# Patient Record
Sex: FEMALE | Race: OTHER | HISPANIC OR LATINO
[De-identification: names, ages, dates, MRNs, and addresses within clinical notes are randomized per-mention and may not be internally consistent; named-entity substitution may affect disease eponyms.]

---

## 2017-12-12 ENCOUNTER — RESULT REVIEW (OUTPATIENT)
Age: 22
End: 2017-12-12

## 2017-12-12 ENCOUNTER — INPATIENT (INPATIENT)
Facility: HOSPITAL | Age: 22
LOS: 1 days | Discharge: HOME | End: 2017-12-14
Attending: OBSTETRICS & GYNECOLOGY | Admitting: OBSTETRICS & GYNECOLOGY
Payer: MEDICAID

## 2017-12-12 VITALS — HEIGHT: 63 IN | WEIGHT: 149.91 LBS

## 2017-12-12 DIAGNOSIS — Z34.80 ENCOUNTER FOR SUPERVISION OF OTHER NORMAL PREGNANCY, UNSPECIFIED TRIMESTER: ICD-10-CM

## 2017-12-12 DIAGNOSIS — O26.899 OTHER SPECIFIED PREGNANCY RELATED CONDITIONS, UNSPECIFIED TRIMESTER: ICD-10-CM

## 2017-12-12 DIAGNOSIS — Z3A.00 WEEKS OF GESTATION OF PREGNANCY NOT SPECIFIED: ICD-10-CM

## 2017-12-12 LAB
APTT BLD: 30.3 SEC — SIGNIFICANT CHANGE UP (ref 27.5–37.4)
BASOPHILS # BLD AUTO: 0.1 K/UL — SIGNIFICANT CHANGE UP (ref 0–0.2)
BASOPHILS NFR BLD AUTO: 0.8 % — SIGNIFICANT CHANGE UP (ref 0–2)
EOSINOPHIL # BLD AUTO: 0.1 K/UL — SIGNIFICANT CHANGE UP (ref 0–0.5)
EOSINOPHIL NFR BLD AUTO: 0.5 % — SIGNIFICANT CHANGE UP (ref 0–6)
FIBRINOGEN PPP-MCNC: 659 MG/DL — HIGH (ref 310–510)
HBV SURFACE AG SERPL QL IA: SIGNIFICANT CHANGE UP
HCT VFR BLD CALC: 41.6 % — SIGNIFICANT CHANGE UP (ref 34.5–45)
HGB BLD-MCNC: 13.6 G/DL — SIGNIFICANT CHANGE UP (ref 11.5–15.5)
INR BLD: 0.96 RATIO — SIGNIFICANT CHANGE UP (ref 0.88–1.16)
LYMPHOCYTES # BLD AUTO: 1.9 K/UL — SIGNIFICANT CHANGE UP (ref 1–3.3)
LYMPHOCYTES # BLD AUTO: 17.6 % — SIGNIFICANT CHANGE UP (ref 13–44)
MCHC RBC-ENTMCNC: 32.3 PG — SIGNIFICANT CHANGE UP (ref 27–34)
MCHC RBC-ENTMCNC: 32.6 GM/DL — SIGNIFICANT CHANGE UP (ref 32–36)
MCV RBC AUTO: 99.2 FL — SIGNIFICANT CHANGE UP (ref 80–100)
MONOCYTES # BLD AUTO: 0.7 K/UL — SIGNIFICANT CHANGE UP (ref 0–0.9)
MONOCYTES NFR BLD AUTO: 6.3 % — SIGNIFICANT CHANGE UP (ref 2–14)
NEUTROPHILS # BLD AUTO: 8.3 K/UL — HIGH (ref 1.8–7.4)
NEUTROPHILS NFR BLD AUTO: 74.9 % — SIGNIFICANT CHANGE UP (ref 43–77)
PCP SPEC-MCNC: SIGNIFICANT CHANGE UP
PLATELET # BLD AUTO: 191 K/UL — SIGNIFICANT CHANGE UP (ref 150–400)
PROTHROM AB SERPL-ACNC: 10.5 SEC — SIGNIFICANT CHANGE UP (ref 9.8–12.7)
RBC # BLD: 4.2 M/UL — SIGNIFICANT CHANGE UP (ref 3.8–5.2)
RBC # FLD: 13 % — SIGNIFICANT CHANGE UP (ref 10.3–14.5)
WBC # BLD: 11 K/UL — HIGH (ref 3.8–10.5)
WBC # FLD AUTO: 11 K/UL — HIGH (ref 3.8–10.5)

## 2017-12-12 PROCEDURE — 76819 FETAL BIOPHYS PROFIL W/O NST: CPT | Mod: 26

## 2017-12-12 PROCEDURE — 88307 TISSUE EXAM BY PATHOLOGIST: CPT | Mod: 26

## 2017-12-12 RX ORDER — ACETAMINOPHEN 500 MG
650 TABLET ORAL EVERY 6 HOURS
Qty: 0 | Refills: 0 | Status: DISCONTINUED | OUTPATIENT
Start: 2017-12-12 | End: 2017-12-14

## 2017-12-12 RX ORDER — OXYTOCIN 10 UNIT/ML
333.33 VIAL (ML) INJECTION
Qty: 20 | Refills: 0 | Status: DISCONTINUED | OUTPATIENT
Start: 2017-12-12 | End: 2017-12-13

## 2017-12-12 RX ORDER — SODIUM CHLORIDE 9 MG/ML
1000 INJECTION, SOLUTION INTRAVENOUS
Qty: 0 | Refills: 0 | Status: DISCONTINUED | OUTPATIENT
Start: 2017-12-12 | End: 2017-12-12

## 2017-12-12 RX ORDER — SIMETHICONE 80 MG/1
80 TABLET, CHEWABLE ORAL EVERY 6 HOURS
Qty: 0 | Refills: 0 | Status: DISCONTINUED | OUTPATIENT
Start: 2017-12-12 | End: 2017-12-14

## 2017-12-12 RX ORDER — TETANUS TOXOID, REDUCED DIPHTHERIA TOXOID AND ACELLULAR PERTUSSIS VACCINE, ADSORBED 5; 2.5; 8; 8; 2.5 [IU]/.5ML; [IU]/.5ML; UG/.5ML; UG/.5ML; UG/.5ML
0.5 SUSPENSION INTRAMUSCULAR ONCE
Qty: 0 | Refills: 0 | Status: DISCONTINUED | OUTPATIENT
Start: 2017-12-12 | End: 2017-12-14

## 2017-12-12 RX ORDER — PRAMOXINE HYDROCHLORIDE 150 MG/15G
1 AEROSOL, FOAM RECTAL EVERY 4 HOURS
Qty: 0 | Refills: 0 | Status: DISCONTINUED | OUTPATIENT
Start: 2017-12-12 | End: 2017-12-14

## 2017-12-12 RX ORDER — AER TRAVELER 0.5 G/1
1 SOLUTION RECTAL; TOPICAL EVERY 4 HOURS
Qty: 0 | Refills: 0 | Status: DISCONTINUED | OUTPATIENT
Start: 2017-12-12 | End: 2017-12-14

## 2017-12-12 RX ORDER — DIPHENHYDRAMINE HCL 50 MG
25 CAPSULE ORAL EVERY 6 HOURS
Qty: 0 | Refills: 0 | Status: DISCONTINUED | OUTPATIENT
Start: 2017-12-12 | End: 2017-12-14

## 2017-12-12 RX ORDER — CITRIC ACID/SODIUM CITRATE 300-500 MG
15 SOLUTION, ORAL ORAL EVERY 4 HOURS
Qty: 0 | Refills: 0 | Status: DISCONTINUED | OUTPATIENT
Start: 2017-12-12 | End: 2017-12-12

## 2017-12-12 RX ORDER — OXYTOCIN 10 UNIT/ML
2 VIAL (ML) INJECTION
Qty: 30 | Refills: 0 | Status: DISCONTINUED | OUTPATIENT
Start: 2017-12-12 | End: 2017-12-12

## 2017-12-12 RX ORDER — SODIUM CHLORIDE 9 MG/ML
3 INJECTION INTRAMUSCULAR; INTRAVENOUS; SUBCUTANEOUS EVERY 8 HOURS
Qty: 0 | Refills: 0 | Status: DISCONTINUED | OUTPATIENT
Start: 2017-12-12 | End: 2017-12-14

## 2017-12-12 RX ORDER — DIBUCAINE 1 %
1 OINTMENT (GRAM) RECTAL EVERY 4 HOURS
Qty: 0 | Refills: 0 | Status: DISCONTINUED | OUTPATIENT
Start: 2017-12-12 | End: 2017-12-14

## 2017-12-12 RX ORDER — DOCUSATE SODIUM 100 MG
100 CAPSULE ORAL
Qty: 0 | Refills: 0 | Status: DISCONTINUED | OUTPATIENT
Start: 2017-12-12 | End: 2017-12-14

## 2017-12-12 RX ORDER — SODIUM CHLORIDE 9 MG/ML
1000 INJECTION, SOLUTION INTRAVENOUS ONCE
Qty: 0 | Refills: 0 | Status: DISCONTINUED | OUTPATIENT
Start: 2017-12-12 | End: 2017-12-12

## 2017-12-12 RX ORDER — LANOLIN
1 OINTMENT (GRAM) TOPICAL EVERY 6 HOURS
Qty: 0 | Refills: 0 | Status: DISCONTINUED | OUTPATIENT
Start: 2017-12-12 | End: 2017-12-14

## 2017-12-12 RX ORDER — HYDROCORTISONE 1 %
1 OINTMENT (GRAM) TOPICAL EVERY 4 HOURS
Qty: 0 | Refills: 0 | Status: DISCONTINUED | OUTPATIENT
Start: 2017-12-12 | End: 2017-12-14

## 2017-12-12 RX ORDER — OXYCODONE AND ACETAMINOPHEN 5; 325 MG/1; MG/1
2 TABLET ORAL EVERY 6 HOURS
Qty: 0 | Refills: 0 | Status: DISCONTINUED | OUTPATIENT
Start: 2017-12-12 | End: 2017-12-14

## 2017-12-12 RX ORDER — OXYTOCIN 10 UNIT/ML
41.67 VIAL (ML) INJECTION
Qty: 20 | Refills: 0 | Status: DISCONTINUED | OUTPATIENT
Start: 2017-12-12 | End: 2017-12-14

## 2017-12-12 RX ORDER — IBUPROFEN 200 MG
600 TABLET ORAL EVERY 6 HOURS
Qty: 0 | Refills: 0 | Status: DISCONTINUED | OUTPATIENT
Start: 2017-12-12 | End: 2017-12-14

## 2017-12-12 RX ADMIN — SODIUM CHLORIDE 125 MILLILITER(S): 9 INJECTION, SOLUTION INTRAVENOUS at 18:35

## 2017-12-12 RX ADMIN — Medication 2 MILLIUNIT(S)/MIN: at 20:45

## 2017-12-12 RX ADMIN — Medication 650 MILLIGRAM(S): at 23:09

## 2017-12-13 DIAGNOSIS — F41.9 ANXIETY DISORDER, UNSPECIFIED: ICD-10-CM

## 2017-12-13 LAB
BASOPHILS # BLD AUTO: 0.1 K/UL — SIGNIFICANT CHANGE UP (ref 0–0.2)
BASOPHILS NFR BLD AUTO: 0.6 % — SIGNIFICANT CHANGE UP (ref 0–2)
EOSINOPHIL # BLD AUTO: 0 K/UL — SIGNIFICANT CHANGE UP (ref 0–0.5)
EOSINOPHIL NFR BLD AUTO: 0.1 % — SIGNIFICANT CHANGE UP (ref 0–6)
HCT VFR BLD CALC: 34.4 % — LOW (ref 34.5–45)
HGB BLD-MCNC: 11.7 G/DL — SIGNIFICANT CHANGE UP (ref 11.5–15.5)
LYMPHOCYTES # BLD AUTO: 17.2 % — SIGNIFICANT CHANGE UP (ref 13–44)
LYMPHOCYTES # BLD AUTO: 2.3 K/UL — SIGNIFICANT CHANGE UP (ref 1–3.3)
MCHC RBC-ENTMCNC: 33.4 PG — SIGNIFICANT CHANGE UP (ref 27–34)
MCHC RBC-ENTMCNC: 33.9 GM/DL — SIGNIFICANT CHANGE UP (ref 32–36)
MCV RBC AUTO: 98.6 FL — SIGNIFICANT CHANGE UP (ref 80–100)
MONOCYTES # BLD AUTO: 1 K/UL — HIGH (ref 0–0.9)
MONOCYTES NFR BLD AUTO: 7.4 % — SIGNIFICANT CHANGE UP (ref 2–14)
NEUTROPHILS # BLD AUTO: 9.9 K/UL — HIGH (ref 1.8–7.4)
NEUTROPHILS NFR BLD AUTO: 74.7 % — SIGNIFICANT CHANGE UP (ref 43–77)
PLATELET # BLD AUTO: 190 K/UL — SIGNIFICANT CHANGE UP (ref 150–400)
RBC # BLD: 3.49 M/UL — LOW (ref 3.8–5.2)
RBC # FLD: 13.1 % — SIGNIFICANT CHANGE UP (ref 10.3–14.5)
T PALLIDUM AB TITR SER: NEGATIVE — SIGNIFICANT CHANGE UP
WBC # BLD: 13.3 K/UL — HIGH (ref 3.8–10.5)
WBC # FLD AUTO: 13.3 K/UL — HIGH (ref 3.8–10.5)

## 2017-12-13 RX ADMIN — SODIUM CHLORIDE 3 MILLILITER(S): 9 INJECTION INTRAMUSCULAR; INTRAVENOUS; SUBCUTANEOUS at 08:00

## 2017-12-13 RX ADMIN — Medication 1 TABLET(S): at 13:24

## 2017-12-13 RX ADMIN — Medication 125 MILLIUNIT(S)/MIN: at 00:01

## 2017-12-13 RX ADMIN — SODIUM CHLORIDE 3 MILLILITER(S): 9 INJECTION INTRAMUSCULAR; INTRAVENOUS; SUBCUTANEOUS at 13:24

## 2017-12-13 RX ADMIN — Medication 650 MILLIGRAM(S): at 00:31

## 2017-12-14 ENCOUNTER — TRANSCRIPTION ENCOUNTER (OUTPATIENT)
Age: 22
End: 2017-12-14

## 2017-12-14 VITALS
DIASTOLIC BLOOD PRESSURE: 67 MMHG | HEART RATE: 65 BPM | SYSTOLIC BLOOD PRESSURE: 114 MMHG | TEMPERATURE: 98 F | OXYGEN SATURATION: 98 % | RESPIRATION RATE: 15 BRPM

## 2017-12-14 PROCEDURE — 86900 BLOOD TYPING SEROLOGIC ABO: CPT

## 2017-12-14 PROCEDURE — 86780 TREPONEMA PALLIDUM: CPT

## 2017-12-14 PROCEDURE — 85610 PROTHROMBIN TIME: CPT

## 2017-12-14 PROCEDURE — 76819 FETAL BIOPHYS PROFIL W/O NST: CPT

## 2017-12-14 PROCEDURE — 36415 COLL VENOUS BLD VENIPUNCTURE: CPT

## 2017-12-14 PROCEDURE — 80307 DRUG TEST PRSMV CHEM ANLYZR: CPT

## 2017-12-14 PROCEDURE — 87340 HEPATITIS B SURFACE AG IA: CPT

## 2017-12-14 PROCEDURE — 86850 RBC ANTIBODY SCREEN: CPT

## 2017-12-14 PROCEDURE — 59025 FETAL NON-STRESS TEST: CPT

## 2017-12-14 PROCEDURE — 86901 BLOOD TYPING SEROLOGIC RH(D): CPT

## 2017-12-14 PROCEDURE — G0463: CPT

## 2017-12-14 PROCEDURE — 85730 THROMBOPLASTIN TIME PARTIAL: CPT

## 2017-12-14 PROCEDURE — 85384 FIBRINOGEN ACTIVITY: CPT

## 2017-12-14 PROCEDURE — 85027 COMPLETE CBC AUTOMATED: CPT

## 2017-12-14 PROCEDURE — 59050 FETAL MONITOR W/REPORT: CPT

## 2017-12-14 RX ORDER — SENNA PLUS 8.6 MG/1
2 TABLET ORAL
Qty: 20 | Refills: 0
Start: 2017-12-14 | End: 2017-12-23

## 2017-12-14 RX ORDER — IBUPROFEN 200 MG
1 TABLET ORAL
Qty: 40 | Refills: 0
Start: 2017-12-14 | End: 2017-12-23

## 2017-12-14 RX ORDER — DOCUSATE SODIUM 100 MG
1 CAPSULE ORAL
Qty: 40 | Refills: 0
Start: 2017-12-14 | End: 2018-01-02

## 2017-12-14 NOTE — DISCHARGE NOTE OB - CARE PLAN
Principal Discharge DX:	 (normal spontaneous vaginal delivery)  Goal:	pain management and breastfeeding  Instructions for follow-up, activity and diet:	Continue breastfeeding.  Motrin as needed for pain. Avoid strenuous activity, no heavy lifting. Nothing per vagina x 6 weeks - no sex, tampons, douching, tub baths, etc.  Follow up in office in 5-6 weeks for check up  Secondary Diagnosis:	Anxiety  Goal:	cleared by social work

## 2017-12-14 NOTE — PROGRESS NOTE ADULT - PROBLEM SELECTOR PLAN 1
A/P:  Postpartum day two in stable condition   - Discharge home with instructions  -Follow up in office in 5-6 weeks for postpartum visit  -Breastfeeding encouraged   -Continue pain management  -Encourage OOB and ambulation  -Continue current care
Pain management  continue breastfeeding  routine postpartum care

## 2017-12-14 NOTE — PROGRESS NOTE ADULT - SUBJECTIVE AND OBJECTIVE BOX
Patient seen resting comfortably.  No new complaints. Reports incisional pain, controlled on pain medications.  Denies CP, SOB, N/V/D, dizziness, palpitations. Voiding spontaneously, denies dysuria, urgency or frequency. +Flatus, - BM. Tolerating regular diet.     Vital Signs Last 24 Hrs  T(C): 36.9 (14 Dec 2017 05:28), Max: 37.2 (13 Dec 2017 19:21)  T(F): 98.4 (14 Dec 2017 05:28), Max: 98.9 (13 Dec 2017 19:21)  HR: 65 (14 Dec 2017 05:28) (65 - 90)  BP: 114/67 (14 Dec 2017 05:28) (103/63 - 114/67)  RR: 15 (14 Dec 2017 05:28) (15 - 18)  SpO2: 98% (14 Dec 2017 05:28) (97% - 100%)    Gen: A&O x 3, NAD  Chest: CTABL  Cardiac: S1+S2+ RRR  Breast: Soft, nontender, nonengorged  Abdomen: Nontender, nondistended, +BS, Incision c/d/i, no erythema  Gyn: lochia wnl  Extremities: Nontender, no worsening edema                          11.7   13.3  )-----------( 190      ( 13 Dec 2017 18:09 )             34.4
Patient evaluated at bedside, offers no acute complaints.  Resting comfortably with baby at bedside.  Tolerating regular diet, Voiding without difficulty; +flatus, -BM  Currently breast and bottlefeeding.  Denies fever/chills, nausea/vomiting, headache, dizziness, chest pains, shortness of breath, palpitations    Vital Signs Last 24 Hrs  T(C): 36.7 (13 Dec 2017 08:16), Max: 37 (13 Dec 2017 06:41)  T(F): 98.1 (13 Dec 2017 08:16), Max: 98.6 (13 Dec 2017 06:41)  HR: 90 (13 Dec 2017 08:16) (80 - 100)  BP: 108/71 (13 Dec 2017 08:16) (92/53 - 126/73)  BP(mean): --  RR: 18 (13 Dec 2017 08:16) (16 - 18)  SpO2: 100% (13 Dec 2017 08:16) (97% - 100%)    PE: Pt appears comfortable, NAD, AAOx3  Chest: CTA bilaterally, no W/R/R  Cardiac: RRR  Breasts: soft, NT/nonengorged bilaterally; no masses, no erythema  Abd: soft; NT; no guarding or rebound; +BS x4 quad; Fundus firm NT below umbilicus  Gyn: moderate lochia, intact/repaired  Ext: soft, NT; DTRs 2+ bilaterally; no worsening edema                          13.6   11.0  )-----------( 191      ( 12 Dec 2017 17:39 )             41.6

## 2017-12-14 NOTE — DISCHARGE NOTE OB - HOSPITAL COURSE
Patient came in early labor, s/p   routine post partum care, clearance by social work  discharged home clinically stable

## 2017-12-14 NOTE — DISCHARGE NOTE OB - MEDICATION SUMMARY - MEDICATIONS TO TAKE
I will START or STAY ON the medications listed below when I get home from the hospital:    ibuprofen 600 mg oral tablet  -- 1 tab(s) by mouth every 6 hours, As needed, Moderate Pain  -- Indication: For pain, as needed    docusate sodium 100 mg oral capsule  -- 1 cap(s) by mouth 2 times a day, As needed, Stool Softening  -- Indication: For Stool softener    senna 15 mg oral tablet  -- 2 tab(s) by mouth once a day (at bedtime)   -- Indication: For constipation

## 2017-12-14 NOTE — DISCHARGE NOTE OB - PATIENT PORTAL LINK FT
“You can access the FollowHealth Patient Portal, offered by [facility], by registering with the following website: [email or web address]”

## 2017-12-14 NOTE — DISCHARGE NOTE OB - PLAN OF CARE
pain management and breastfeeding Continue breastfeeding.  Motrin as needed for pain. Avoid strenuous activity, no heavy lifting. Nothing per vagina x 6 weeks - no sex, tampons, douching, tub baths, etc.  Follow up in office in 5-6 weeks for check up cleared by social work

## 2017-12-14 NOTE — PROGRESS NOTE ADULT - PROBLEM SELECTOR PLAN 2
discharge pending social work clearance, patient is otherwise medically cleared
social work ordered for evaluation

## 2017-12-14 NOTE — PROGRESS NOTE ADULT - ASSESSMENT
PPD 2 s/p  at 39 weeks; clinically stable, discharge pending social work clearance
22y s/p  PPD#1, stable

## 2018-01-12 PROBLEM — Z00.00 ENCOUNTER FOR PREVENTIVE HEALTH EXAMINATION: Status: ACTIVE | Noted: 2018-01-12

## 2018-01-22 ENCOUNTER — APPOINTMENT (OUTPATIENT)
Dept: OBGYN | Facility: CLINIC | Age: 23
End: 2018-01-22

## 2019-04-15 NOTE — PATIENT PROFILE OB - WEIGHT PRESENT IN KG
[email or web address] ,[email or web address]

## 2021-03-04 NOTE — DISCHARGE NOTE OB - AVOID SITTING IN ONE POSITION FOR MORE THAN ONE HOUR
-- DO NOT REPLY / DO NOT REPLY ALL --  -- Message is from the [facility]--    COVID-19 Universal Screening: N/A - Not about scheduling    General Patient Message      Reason for Call: Patient called in because she missed a call from Dr. Frankel office because they want to know her reason for visit . Please call patient     Caller Information       Type Contact Phone    03/04/2021 04:02 PM CST Phone (Incoming) MaddenElla wood (Self) [phone] (M)          Alternative phone number: none    Turnaround time given to caller:   \"This message will be sent to [state Provider's name]. The clinical team will fulfill your request as soon as they review your message.\"     Statement Selected

## 2022-08-07 ENCOUNTER — OUTPATIENT (OUTPATIENT)
Dept: INPATIENT UNIT | Facility: HOSPITAL | Age: 27
LOS: 1 days | Discharge: HOME | End: 2022-08-07

## 2022-08-07 VITALS
TEMPERATURE: 99 F | RESPIRATION RATE: 14 BRPM | HEART RATE: 96 BPM | SYSTOLIC BLOOD PRESSURE: 122 MMHG | DIASTOLIC BLOOD PRESSURE: 82 MMHG

## 2022-08-07 VITALS — HEART RATE: 67 BPM | DIASTOLIC BLOOD PRESSURE: 59 MMHG | SYSTOLIC BLOOD PRESSURE: 107 MMHG

## 2022-08-07 DIAGNOSIS — Z98.890 OTHER SPECIFIED POSTPROCEDURAL STATES: Chronic | ICD-10-CM

## 2022-08-07 DIAGNOSIS — O26.899 OTHER SPECIFIED PREGNANCY RELATED CONDITIONS, UNSPECIFIED TRIMESTER: ICD-10-CM

## 2022-08-07 DIAGNOSIS — Z3A.00 WEEKS OF GESTATION OF PREGNANCY NOT SPECIFIED: ICD-10-CM

## 2022-08-07 LAB
APPEARANCE UR: ABNORMAL
BACTERIA # UR AUTO: ABNORMAL
BILIRUB UR-MCNC: NEGATIVE — SIGNIFICANT CHANGE UP
COLOR SPEC: YELLOW — SIGNIFICANT CHANGE UP
DIFF PNL FLD: NEGATIVE — SIGNIFICANT CHANGE UP
EPI CELLS # UR: 2 /HPF — SIGNIFICANT CHANGE UP (ref 0–5)
GLUCOSE UR QL: NEGATIVE — SIGNIFICANT CHANGE UP
HYALINE CASTS # UR AUTO: 0 /LPF — SIGNIFICANT CHANGE UP (ref 0–7)
KETONES UR-MCNC: ABNORMAL
LEUKOCYTE ESTERASE UR-ACNC: ABNORMAL
NITRITE UR-MCNC: NEGATIVE — SIGNIFICANT CHANGE UP
PH UR: 7.5 — SIGNIFICANT CHANGE UP (ref 5–8)
PROT UR-MCNC: ABNORMAL
RBC CASTS # UR COMP ASSIST: 1 /HPF — SIGNIFICANT CHANGE UP (ref 0–4)
SP GR SPEC: 1.01 — SIGNIFICANT CHANGE UP (ref 1–1.05)
UROBILINOGEN FLD QL: SIGNIFICANT CHANGE UP
WBC UR QL: 30 /HPF — HIGH (ref 0–5)

## 2022-08-07 PROCEDURE — 99213 OFFICE O/P EST LOW 20 MIN: CPT | Mod: 25

## 2022-08-07 PROCEDURE — 76818 FETAL BIOPHYS PROFILE W/NST: CPT | Mod: 26

## 2022-08-07 RX ORDER — PROGESTERONE 200 MG/1
1 CAPSULE, LIQUID FILLED ORAL
Qty: 0 | Refills: 0 | DISCHARGE

## 2022-08-07 NOTE — OB PROVIDER TRIAGE NOTE - NSHPLABSRESULTS_GEN_ALL_CORE
Urinalysis Basic - ( 07 Aug 2022 20:56 )    Color: Yellow / Appearance: Slightly Turbid / S.011 / pH: x  Gluc: x / Ketone: Trace  / Bili: Negative / Urobili: <2 mg/dL   Blood: x / Protein: Trace / Nitrite: Negative   Leuk Esterase: Large / RBC: 1 /HPF / WBC 30 /HPF   Sq Epi: x / Non Sq Epi: 2 /HPF / Bacteria: Few

## 2022-08-07 NOTE — OB RN TRIAGE NOTE - LIVING CHILDREN, OB PROFILE
pt states she had boil lanced on her right breast today as well as a cortisone shot her other breast. pt states she had fever 103 at home took 2 extra strength Tylenol around 6pm. pt c/o left breast pain and feels like her heart is racing. 1

## 2022-08-07 NOTE — OB RN TRIAGE NOTE - ABORTIONS, OB PROFILE
3 Rhofade Pregnancy And Lactation Text: This medication has not been assigned a Pregnancy Risk Category. It is unknown if the medication is excreted in breast milk.

## 2022-08-07 NOTE — OB PROVIDER TRIAGE NOTE - CARE PLAN
1 Otezla Counseling: The side effects of Otezla were discussed with the patient, including but not limited to worsening or new depression, weight loss, diarrhea, nausea, upper respiratory tract infection, and headache. Patient instructed to call the office should any adverse effect occur.  The patient verbalized understanding of the proper use and possible adverse effects of Otezla.  All the patient's questions and concerns were addressed.

## 2022-08-07 NOTE — OB PROVIDER TRIAGE NOTE - NSHPPHYSICALEXAM_GEN_ALL_CORE
NST--FHR: 140 HR baseline, moderate variability, accelerations present, no decelerations, Category 1.  [place]: Contractions present, irregular  TAUS: cephalic presentation, posterior placenta, RONY 10.89, BPP 8/8  SSE: moderate amount of yellowish discharge, cervix appear dilated. no bleeding noted, negative Nitrazine, negative ferning.   SVE; 2cm/50/-3 Vital Signs Last 24 Hrs  T(C): 37.1 (07 Aug 2022 18:48), Max: 37.1 (07 Aug 2022 18:48)  T(F): 98.8 (07 Aug 2022 18:48), Max: 98.8 (07 Aug 2022 18:48)  HR: 67 (07 Aug 2022 21:26) (67 - 96)  BP: 107/59 (07 Aug 2022 21:26) (97/56 - 131/79)  RR: 14 (07 Aug 2022 18:48) (14 - 14)        Gen: NAD  Head: NC/AT  Cardio: RRR  Resp: Clear lung sound bilaterally  Abdomen: Soft, Nontender  Extremities: No LE edema bilaterally    NST--FHR: 140 HR baseline, moderate variability, accelerations present, no decelerations, Category 1.  [place]: Contractions present, irregular  TAUS: cephalic presentation, posterior placenta, RONY 10.89, BPP 8/8  SSE: moderate amount of yellowish discharge, cervix appear dilated. no bleeding noted, negative Nitrazine, negative ferning.   SVE; 2cm/50/-3

## 2022-08-07 NOTE — OB PROVIDER TRIAGE NOTE - NSOBPROVIDERNOTE_OBGYN_ALL_OB_FT
27 yo , EGA@38 weeks R/O Rupture of membrane 27 yo , EGA@38 weeks R/O Rupture of membrane   discuss with Dr. Davis PGY-3   Management of further care was discussed with Dr. Vivienne burgos S/S of Rupture of membrane at this time  pt to keep self very well hydrated.   Discharge patient home.  Labor precautions and fetal movements count were reviewed; if not in labor, will follow up with your next schedule OB appointment.  Prior notes, lab results (prenatal chart review, prenatal labs) and recommendations were reviewed;  All ordered tests results reviewed and interpreted.  Plan of care was reviewed with patient and family; patient states understanding of the above plan.  In total 35 minutes spent with established/new patient.

## 2022-08-07 NOTE — OB RN TRIAGE NOTE - CURRENT PREGNANCY COMPLICATIONS, OB PROFILE
dx short cvx since 2nd trimester, progesterone until 36wks/Incompetent Cervix/Cervical Insufficiency

## 2022-08-07 NOTE — OB PROVIDER TRIAGE NOTE - NS_OBGYNHISTORY_OBGYN_ALL_OB_FT
2017  @ 38 weeks F 6-12 complicated by NRFHR. at [facility]  TOP x 1 with meds  and SAB x 2 1 with D&C

## 2022-08-07 NOTE — OB PROVIDER TRIAGE NOTE - HISTORY OF PRESENT ILLNESS
27 yo , EGA@38 weeks, presented to D&T with c/o yellowish to greenish discharged starting from 3 PM 22, Pt also C/O of contractions irregular, every 2-3hrs pain 2/10, denies vaginal bleeding, and reports fetal movement.  Pt stated that she was checked yesterday by her provider and she was 2Cm dilated. Denies fever, chills, headaches, changes in vision, chest pain, palpitations, shortness of breath, cough, nausea, vomiting, diarrhea, constipation, urinary symptoms, edema.    Prenatal care with Dr. stark at [facility]  Prenatal course is complicated by shorten cervix diagnose since 2nd trimester, Pt was on vaginal progesterone till 36 weeks.    GBS status is unknown  Patient denies signs and symptoms of COVID 19; denies symptomatic illness; fully vaccinated.    No adverse reactions to anesthesia, no objections to blood transfusions if clinically indicated.  OB hx:   2017  @ 38 weeks F 6-12 complicated by NRFHR. at [facility]  TOP x 1 with meds  and SAB x 2 1 with D&C     Med hx: Carrier Polycystic Kidney.  Surg hx: D&C x 1  GYN hx: denies hx of abnormal papsmear/cysts/fibroids/STDs  Meds: ION, PNV  Allergies: NKDA    Social hx: Denies alcohol, tobacco, drug use  Psych hx: denies hx of anxiety/depression     27 yo , EGA@38 weeks, presented to D&T with c/o yellowish to greenish discharged starting from 3 PM 22, Pt also C/O of contractions irregular, every 2-3hrs pain /10, Pt report decrease fetal movement today. Pt. denies vaginal bleeding.  Pt stated that she was checked yesterday by her provider and she was 2Cm dilated. Denies fever, chills, headaches, changes in vision, chest pain, palpitations, shortness of breath, cough, nausea, vomiting, diarrhea, constipation, urinary symptoms, edema.    Prenatal care with Dr. stark at [facility]  Prenatal course is complicated by shorten cervix diagnose since 2nd trimester, Pt was on vaginal progesterone till 36 weeks.    GBS status is unknown  Patient denies signs and symptoms of COVID 19; denies symptomatic illness; fully vaccinated.    No adverse reactions to anesthesia, no objections to blood transfusions if clinically indicated.  OB hx:   2017  @ 38 weeks F 6-12 complicated by NRFHR. at [facility]  TOP x 1 with meds  and SAB x 2 1 with D&C     Med hx: Carrier Polycystic Kidney.  Surg hx: D&C x 1  GYN hx: denies hx of abnormal papsmear/cysts/fibroids/STDs  Meds: ION, PNV  Allergies: NKDA    Social hx: Denies alcohol, tobacco, drug use  Psych hx: denies hx of anxiety/depression

## 2022-08-08 ENCOUNTER — TRANSCRIPTION ENCOUNTER (OUTPATIENT)
Age: 27
End: 2022-08-08

## 2022-08-11 ENCOUNTER — OUTPATIENT (OUTPATIENT)
Dept: OUTPATIENT SERVICES | Facility: HOSPITAL | Age: 27
LOS: 1 days | Discharge: HOME | End: 2022-08-11

## 2022-08-11 VITALS
SYSTOLIC BLOOD PRESSURE: 103 MMHG | RESPIRATION RATE: 16 BRPM | DIASTOLIC BLOOD PRESSURE: 57 MMHG | TEMPERATURE: 99 F | HEART RATE: 107 BPM

## 2022-08-11 VITALS — DIASTOLIC BLOOD PRESSURE: 59 MMHG | SYSTOLIC BLOOD PRESSURE: 114 MMHG | HEART RATE: 83 BPM

## 2022-08-11 DIAGNOSIS — Z98.890 OTHER SPECIFIED POSTPROCEDURAL STATES: Chronic | ICD-10-CM

## 2022-08-11 DIAGNOSIS — Z3A.00 WEEKS OF GESTATION OF PREGNANCY NOT SPECIFIED: ICD-10-CM

## 2022-08-11 DIAGNOSIS — O26.899 OTHER SPECIFIED PREGNANCY RELATED CONDITIONS, UNSPECIFIED TRIMESTER: ICD-10-CM

## 2022-08-11 LAB — AMNISURE ROM (RUPTURE OF MEMBRANES): NEGATIVE — SIGNIFICANT CHANGE UP

## 2022-08-11 PROCEDURE — 76818 FETAL BIOPHYS PROFILE W/NST: CPT | Mod: 26

## 2022-08-11 PROCEDURE — 99213 OFFICE O/P EST LOW 20 MIN: CPT

## 2022-08-11 RX ORDER — PROGESTERONE 200 MG/1
0 CAPSULE, LIQUID FILLED ORAL
Qty: 0 | Refills: 0 | DISCHARGE

## 2022-08-11 NOTE — OB PROVIDER TRIAGE NOTE - HISTORY OF PRESENT ILLNESS
25y/o  DEFAULT  @38.4wks presents with leaking of clear fluid since 1030pm on 8/10. States she was at her MD office today and was told to go to hospital for further evaluation for prom due to discharge and patient feeling wet since last night. Also states mild contractions felt 2x an hour   Patient MD delivers at [facility]  Reports good fetal movement  Denies VB    Allergies: Denies   Medications: PNV    Medical HX: Congenital Polycystic Kidney Disease   Surgical HX: Denies  Denies Etoh/Smoke/Drugs/Psy   27y/o DEFAULT  @38.4wks presents with leaking of clear fluid since 1030pm on 8/10. States she was at her MD office today and was told to go to hospital for further evaluation for prom due to discharge and patient feeling wet since last night. Also states mild contractions felt 2x an hour   Patient MD delivers at [facility]  Reports good fetal movement  Denies VB    Allergies: Denies   Medications: PNV    Medical HX: Congenital Polycystic Kidney Disease   Surgical HX: Denies  Denies Etoh/Smoke/Drugs/Psy

## 2022-08-11 NOTE — OB PROVIDER TRIAGE NOTE - NS_OBGYNHISTORY_OBGYN_ALL_OB_FT
GYN: Denies  OB:   18  female at 38wks  TOPx1  SABx2 with D&C    AP course complicated by  -Incompetent cervix   -Vaginal Progesterone, stopped at 36 weeks  -Patient receives are at [facility]

## 2022-08-11 NOTE — OB PROVIDER TRIAGE NOTE - PLAN OF CARE
D/C Home  D/W    No evidence of PROM at this time  Normal fetal testing  No evidence of acute process  Follow up at next scheduled prenatal appointment on 8/18  Return for decreased fetal movement, loss of fluid or vaginal bleeding  Increase oral hydration  Signs and Symptoms of Labor reviewed

## 2022-08-11 NOTE — OB PROVIDER TRIAGE NOTE - ADDITIONAL INSTRUCTIONS
recall entered, faxed to PCP. Follow up at next scheduled prenatal appointment on 8/18  Return for decreased fetal movement, loss of fluid or vaginal bleeding  Increase oral hydration  Signs and Symptoms of Labor reviewed

## 2022-08-11 NOTE — OB PROVIDER TRIAGE NOTE - NSHPPHYSICALEXAM_GEN_ALL_CORE
Vital Signs Last 24 Hrs  T(C): 37 (11 Aug 2022 17:45), Max: 37 (11 Aug 2022 17:45)  T(F): 98.6 (11 Aug 2022 17:45), Max: 98.6 (11 Aug 2022 17:45)  HR: 83 (11 Aug 2022 20:03) (81 - 107)  BP: 114/59 (11 Aug 2022 20:03) (94/56 - 114/59)  RR: 16 (11 Aug 2022 17:45) (16 - 16)    Assessment reveals VSS  Abdomen soft, NT, gravid  Cat 1 tracing, irregular ctx  Transabdominal Ultrasound-   Sterile Speculum- nitrazine equivocal, ferning, +leukorrhea noted   Vaginal Exam- 2/70/-3  A&Ox3  Lungs- clear bilateral  Heart- normal rate and rhythm      PLAN:  Amnisure Collected Vital Signs Last 24 Hrs  T(C): 37 (11 Aug 2022 17:45), Max: 37 (11 Aug 2022 17:45)  T(F): 98.6 (11 Aug 2022 17:45), Max: 98.6 (11 Aug 2022 17:45)  HR: 83 (11 Aug 2022 20:03) (81 - 107)  BP: 114/59 (11 Aug 2022 20:03) (94/56 - 114/59)  RR: 16 (11 Aug 2022 17:45) (16 - 16)    Assessment reveals VSS  Abdomen soft, NT, gravid  Cat 1 tracing, irregular ctx  Transabdominal Ultrasound- vtx, fundal placenta, bpp8/8, josep: 11.11  Sterile Speculum- nitrazine equivocal, ferning, +leukorrhea noted   Vaginal Exam- 2/70/-3  A&Ox3  Lungs- clear bilateral  Heart- normal rate and rhythm      PLAN:  Amnisure Collected

## 2022-08-12 PROBLEM — Q61.3 POLYCYSTIC KIDNEY, UNSPECIFIED: Chronic | Status: ACTIVE | Noted: 2022-08-07

## 2022-08-14 ENCOUNTER — TRANSCRIPTION ENCOUNTER (OUTPATIENT)
Age: 27
End: 2022-08-14

## 2022-08-14 ENCOUNTER — INPATIENT (INPATIENT)
Facility: HOSPITAL | Age: 27
LOS: 1 days | Discharge: HOME | End: 2022-08-16
Attending: OBSTETRICS & GYNECOLOGY | Admitting: OBSTETRICS & GYNECOLOGY
Payer: MEDICAID

## 2022-08-14 VITALS
HEIGHT: 60 IN | HEART RATE: 77 BPM | OXYGEN SATURATION: 100 % | SYSTOLIC BLOOD PRESSURE: 123 MMHG | WEIGHT: 158.07 LBS | DIASTOLIC BLOOD PRESSURE: 81 MMHG | TEMPERATURE: 98 F | RESPIRATION RATE: 17 BRPM

## 2022-08-14 DIAGNOSIS — Z3A.00 WEEKS OF GESTATION OF PREGNANCY NOT SPECIFIED: ICD-10-CM

## 2022-08-14 DIAGNOSIS — O26.899 OTHER SPECIFIED PREGNANCY RELATED CONDITIONS, UNSPECIFIED TRIMESTER: ICD-10-CM

## 2022-08-14 DIAGNOSIS — Z34.80 ENCOUNTER FOR SUPERVISION OF OTHER NORMAL PREGNANCY, UNSPECIFIED TRIMESTER: ICD-10-CM

## 2022-08-14 DIAGNOSIS — Z98.890 OTHER SPECIFIED POSTPROCEDURAL STATES: Chronic | ICD-10-CM

## 2022-08-14 RX ORDER — CITRIC ACID/SODIUM CITRATE 300-500 MG
15 SOLUTION, ORAL ORAL EVERY 6 HOURS
Refills: 0 | Status: DISCONTINUED | OUTPATIENT
Start: 2022-08-14 | End: 2022-08-15

## 2022-08-14 RX ORDER — SODIUM CHLORIDE 9 MG/ML
1000 INJECTION, SOLUTION INTRAVENOUS
Refills: 0 | Status: DISCONTINUED | OUTPATIENT
Start: 2022-08-14 | End: 2022-08-15

## 2022-08-14 RX ORDER — CHLORHEXIDINE GLUCONATE 213 G/1000ML
1 SOLUTION TOPICAL ONCE
Refills: 0 | Status: DISCONTINUED | OUTPATIENT
Start: 2022-08-14 | End: 2022-08-15

## 2022-08-14 RX ORDER — OXYTOCIN 10 UNIT/ML
333.33 VIAL (ML) INJECTION
Qty: 20 | Refills: 0 | Status: DISCONTINUED | OUTPATIENT
Start: 2022-08-14 | End: 2022-08-15

## 2022-08-14 NOTE — OB PROVIDER H&P - ASSESSMENT
A&P: 26 year old  presents to L and D in labor  Labor: admit to L&D  -expct mgmt  -routine labs  -EFM/toco  -NPO, IV hydration  Fetal: cat 1 tracing, fetal status reassuring  GBS: neg  Analgesia: epi    patient seen and evaluated w dr steve lutz pgy2 A&P: 26 year old  presents to L and D in labor  Labor: admit to L&D  -expct mgmt  -routine labs  -EFM/toco  -NPO, IV hydration  Fetal: cat 1 tracing, fetal status reassuring  GBS:  (per pt report)   Analgesia: epi    patient seen and evaluated w dr steve lutz pgy2 A&P: 26 year old  presents to L and D in labor  Labor: admit to L&D  -expct mgmt  -prenatal labs drawn on admission  -routine labs  -EFM/toco  -NPO, IV hydration  Fetal: cat 1 tracing, fetal status reassuring  GBS:  (per pt report)   Analgesia: epi    patient seen and evaluated w dr steve lutz pgy2

## 2022-08-14 NOTE — OB RN PATIENT PROFILE - FALL HARM RISK - UNIVERSAL INTERVENTIONS
Bed in lowest position, wheels locked, appropriate side rails in place/Call bell, personal items and telephone in reach/Instruct patient to call for assistance before getting out of bed or chair/Non-slip footwear when patient is out of bed/[place] to call system/Physically safe environment - no spills, clutter or unnecessary equipment/Purposeful Proactive Rounding/Room/bathroom lighting operational, light cord in reach

## 2022-08-14 NOTE — OB PROVIDER H&P - HISTORY OF PRESENT ILLNESS
R2 H&P    Pt is a ***y/o G*P*** at *** admitted for ***  Prenatal course  GBS  EFW    OBHx:  GynHx:  PMHx:  PSHx:  Med:  All:  SH:    VS:  GA:  Cards: RRR  Pulm: CTAB  EFH:  [place]:  VE:  TAUS:    A&P:   Labor: admit to L&D  -PO cytotec  -routine labs  -EFM/toco  -NPO, IV hydration  Fetal: cat 1 tracing, fetal status reassuring  GBS: neg  Analgesia:        R2 H&P    Pt is a 27y/o  at 39w admitted for labor term. Patient came in complaining of contractions q2 mins as well as ROM on the way to the hospital. +FM, -VB  Prenatal course uncomplciated  GBS neg (per patient)  EFW 3400    OBHx:  x1 2017, SAB x2, TOP x1 s/p D+C x1  GynHx: Denies cysts, fibroids, abnormal paps, STIs  PMHx: Denies  PSHx: D+C x1  Meds: PNV  All: NKDA  SH: Denies toxic habits x3, no depression, no anxiety

## 2022-08-14 NOTE — OB PROVIDER H&P - ATTENDING COMMENTS
Pt also seen and consented by me.   26 year old  who presented in labor. Pt received PNC at an outside facility not affiliated with [facility].  Per her report she is GBS neg and denies any issues with her PNC except (slight anemia).  On admission her VS were stable and her exam was unremarkable. Pt also seen and consented by me.   26 year old  who presented in labor. Pt received PNC at an outside facility not affiliated with [facility].  Per her report she is GBS neg and denies any issues with her PNC except (slight anemia).  On admission her VS were stable and her exam was unremarkable.   Her histories were reviewed.  Risks of delivery reviewed including but not limited to bleeding, infection, need for operative delivery or CD.  Risks for operative delivery reviewed including but not limited to bleeding, tears, failure and need for CD, injury to fetus leading to neuro injury and death. Risk of CD reviewed with patient including but not limited to bleeding, infection, injury to surrounding organs including bowel, bladder, uterus, tubes, ovaries, risk of injury to fetus, need for transfusion, injury to blood vessels and nerves which may results in permanent loss of function and death.  She is accepting of blood products.  Consents were reviewed and signed and witnessed.  We will attempt to obtain her records from her primary OB MD.      DINORAH Carbajal MD

## 2022-08-14 NOTE — OB PROVIDER H&P - NSHPPHYSICALEXAM_GEN_ALL_CORE
VS:  Vital Signs Last 24 Hrs  T(C): 36.7 (14 Aug 2022 23:16), Max: 36.7 (14 Aug 2022 23:16)  T(F): 98.06 (14 Aug 2022 23:16), Max: 98.06 (14 Aug 2022 23:16)  HR: 86 (14 Aug 2022 23:22) (81 - 86)  BP: 123/81 (14 Aug 2022 23:11) (123/81 - 123/81)  BP(mean): --  RR: --  SpO2: 100% (14 Aug 2022 23:22) (98% - 100%)  GA: NAD  Cards: RRR  Pulm: CTAB  EFH: cat 1  [place]: q 3mins  VE: 6/90/-2  TAUS: vertex

## 2022-08-14 NOTE — OB PROVIDER H&P - NSPREVIOUSTERM_OBGYN_ALL_OB
I will SWITCH the dose or number of times a day I take the medications listed below when I get home from the hospital:  None
Yes

## 2022-08-14 NOTE — OB RN PATIENT PROFILE - TEMPERATURE IN FAHRENHEIT (DEGREES F)
Last seen 10/4/19  Next appointment 3/13/20  Sildenafil 50 mg last refilled 6/28/19 #6 with 5 refills.  #6 with 2 refills sent to pharmacy.      
98.1

## 2022-08-15 LAB
BASOPHILS # BLD AUTO: 0.03 K/UL — SIGNIFICANT CHANGE UP (ref 0–0.2)
BASOPHILS NFR BLD AUTO: 0.3 % — SIGNIFICANT CHANGE UP (ref 0–2)
BLD GP AB SCN SERPL QL: NEGATIVE — SIGNIFICANT CHANGE UP
COVID-19 SPIKE DOMAIN AB INTERP: POSITIVE
COVID-19 SPIKE DOMAIN ANTIBODY RESULT: >250 U/ML — HIGH
EOSINOPHIL # BLD AUTO: 0.05 K/UL — SIGNIFICANT CHANGE UP (ref 0–0.5)
EOSINOPHIL NFR BLD AUTO: 0.5 % — SIGNIFICANT CHANGE UP (ref 0–6)
HBV SURFACE AG SER-ACNC: SIGNIFICANT CHANGE UP
HBV SURFACE AG SERPL QL IA: SIGNIFICANT CHANGE UP
HCT VFR BLD CALC: 35.7 % — SIGNIFICANT CHANGE UP (ref 34.5–45)
HCV RNA SPEC NAA+PROBE-LOG IU: SIGNIFICANT CHANGE UP
HCV RNA SPEC NAA+PROBE-LOG IU: SIGNIFICANT CHANGE UP LOGIU/ML
HGB BLD-MCNC: 12.2 G/DL — SIGNIFICANT CHANGE UP (ref 11.5–15.5)
HIV 1+2 AB+HIV1 P24 AG SERPL QL IA: SIGNIFICANT CHANGE UP
IMM GRANULOCYTES NFR BLD AUTO: 0.6 % — SIGNIFICANT CHANGE UP (ref 0–1.5)
LYMPHOCYTES # BLD AUTO: 1.63 K/UL — SIGNIFICANT CHANGE UP (ref 1–3.3)
LYMPHOCYTES # BLD AUTO: 17.4 % — SIGNIFICANT CHANGE UP (ref 13–44)
MCHC RBC-ENTMCNC: 31.6 PG — SIGNIFICANT CHANGE UP (ref 27–34)
MCHC RBC-ENTMCNC: 34.2 GM/DL — SIGNIFICANT CHANGE UP (ref 32–36)
MCV RBC AUTO: 92.5 FL — SIGNIFICANT CHANGE UP (ref 80–100)
MONOCYTES # BLD AUTO: 0.61 K/UL — SIGNIFICANT CHANGE UP (ref 0–0.9)
MONOCYTES NFR BLD AUTO: 6.5 % — SIGNIFICANT CHANGE UP (ref 2–14)
NEUTROPHILS # BLD AUTO: 7.01 K/UL — SIGNIFICANT CHANGE UP (ref 1.8–7.4)
NEUTROPHILS NFR BLD AUTO: 74.7 % — SIGNIFICANT CHANGE UP (ref 43–77)
NRBC # BLD: 0 /100 WBCS — SIGNIFICANT CHANGE UP (ref 0–0)
PLATELET # BLD AUTO: 208 K/UL — SIGNIFICANT CHANGE UP (ref 150–400)
RBC # BLD: 3.86 M/UL — SIGNIFICANT CHANGE UP (ref 3.8–5.2)
RBC # FLD: 14.1 % — SIGNIFICANT CHANGE UP (ref 10.3–14.5)
RH IG SCN BLD-IMP: POSITIVE — SIGNIFICANT CHANGE UP
RH IG SCN BLD-IMP: POSITIVE — SIGNIFICANT CHANGE UP
RUBV IGG SER-ACNC: 7.1 INDEX — SIGNIFICANT CHANGE UP
RUBV IGG SER-IMP: POSITIVE — SIGNIFICANT CHANGE UP
SARS-COV-2 IGG+IGM SERPL QL IA: >250 U/ML — HIGH
SARS-COV-2 IGG+IGM SERPL QL IA: POSITIVE
SARS-COV-2 RNA SPEC QL NAA+PROBE: SIGNIFICANT CHANGE UP
T PALLIDUM AB TITR SER: NEGATIVE — SIGNIFICANT CHANGE UP
T PALLIDUM AB TITR SER: NEGATIVE — SIGNIFICANT CHANGE UP
WBC # BLD: 9.39 K/UL — SIGNIFICANT CHANGE UP (ref 3.8–10.5)
WBC # FLD AUTO: 9.39 K/UL — SIGNIFICANT CHANGE UP (ref 3.8–10.5)

## 2022-08-15 PROCEDURE — 59409 OBSTETRICAL CARE: CPT | Mod: U9

## 2022-08-15 RX ORDER — OXYCODONE HYDROCHLORIDE 5 MG/1
5 TABLET ORAL
Refills: 0 | Status: DISCONTINUED | OUTPATIENT
Start: 2022-08-15 | End: 2022-08-16

## 2022-08-15 RX ORDER — IBUPROFEN 200 MG
600 TABLET ORAL EVERY 6 HOURS
Refills: 0 | Status: COMPLETED | OUTPATIENT
Start: 2022-08-15 | End: 2023-07-14

## 2022-08-15 RX ORDER — PRAMOXINE HYDROCHLORIDE 150 MG/15G
1 AEROSOL, FOAM RECTAL EVERY 4 HOURS
Refills: 0 | Status: DISCONTINUED | OUTPATIENT
Start: 2022-08-15 | End: 2022-08-16

## 2022-08-15 RX ORDER — ACETAMINOPHEN 500 MG
975 TABLET ORAL
Refills: 0 | Status: DISCONTINUED | OUTPATIENT
Start: 2022-08-15 | End: 2022-08-16

## 2022-08-15 RX ORDER — TETANUS TOXOID, REDUCED DIPHTHERIA TOXOID AND ACELLULAR PERTUSSIS VACCINE, ADSORBED 5; 2.5; 8; 8; 2.5 [IU]/.5ML; [IU]/.5ML; UG/.5ML; UG/.5ML; UG/.5ML
0.5 SUSPENSION INTRAMUSCULAR ONCE
Refills: 0 | Status: DISCONTINUED | OUTPATIENT
Start: 2022-08-15 | End: 2022-08-16

## 2022-08-15 RX ORDER — SODIUM CHLORIDE 9 MG/ML
3 INJECTION INTRAMUSCULAR; INTRAVENOUS; SUBCUTANEOUS EVERY 8 HOURS
Refills: 0 | Status: DISCONTINUED | OUTPATIENT
Start: 2022-08-15 | End: 2022-08-16

## 2022-08-15 RX ORDER — KETOROLAC TROMETHAMINE 30 MG/ML
30 SYRINGE (ML) INJECTION ONCE
Refills: 0 | Status: DISCONTINUED | OUTPATIENT
Start: 2022-08-15 | End: 2022-08-16

## 2022-08-15 RX ORDER — IBUPROFEN 200 MG
600 TABLET ORAL EVERY 6 HOURS
Refills: 0 | Status: DISCONTINUED | OUTPATIENT
Start: 2022-08-15 | End: 2022-08-16

## 2022-08-15 RX ORDER — DIPHENHYDRAMINE HCL 50 MG
25 CAPSULE ORAL EVERY 6 HOURS
Refills: 0 | Status: DISCONTINUED | OUTPATIENT
Start: 2022-08-15 | End: 2022-08-16

## 2022-08-15 RX ORDER — OXYCODONE HYDROCHLORIDE 5 MG/1
5 TABLET ORAL ONCE
Refills: 0 | Status: DISCONTINUED | OUTPATIENT
Start: 2022-08-15 | End: 2022-08-16

## 2022-08-15 RX ORDER — SIMETHICONE 80 MG/1
80 TABLET, CHEWABLE ORAL EVERY 4 HOURS
Refills: 0 | Status: DISCONTINUED | OUTPATIENT
Start: 2022-08-15 | End: 2022-08-16

## 2022-08-15 RX ORDER — DIBUCAINE 1 %
1 OINTMENT (GRAM) RECTAL EVERY 6 HOURS
Refills: 0 | Status: DISCONTINUED | OUTPATIENT
Start: 2022-08-15 | End: 2022-08-16

## 2022-08-15 RX ORDER — MAGNESIUM HYDROXIDE 400 MG/1
30 TABLET, CHEWABLE ORAL
Refills: 0 | Status: DISCONTINUED | OUTPATIENT
Start: 2022-08-15 | End: 2022-08-16

## 2022-08-15 RX ORDER — HYDROCORTISONE 1 %
1 OINTMENT (GRAM) TOPICAL EVERY 6 HOURS
Refills: 0 | Status: DISCONTINUED | OUTPATIENT
Start: 2022-08-15 | End: 2022-08-16

## 2022-08-15 RX ORDER — LANOLIN
1 OINTMENT (GRAM) TOPICAL EVERY 6 HOURS
Refills: 0 | Status: DISCONTINUED | OUTPATIENT
Start: 2022-08-15 | End: 2022-08-16

## 2022-08-15 RX ORDER — AER TRAVELER 0.5 G/1
1 SOLUTION RECTAL; TOPICAL EVERY 4 HOURS
Refills: 0 | Status: DISCONTINUED | OUTPATIENT
Start: 2022-08-15 | End: 2022-08-16

## 2022-08-15 RX ORDER — OXYTOCIN 10 UNIT/ML
333.33 VIAL (ML) INJECTION
Qty: 20 | Refills: 0 | Status: DISCONTINUED | OUTPATIENT
Start: 2022-08-15 | End: 2022-08-16

## 2022-08-15 RX ORDER — BENZOCAINE 10 %
1 GEL (GRAM) MUCOUS MEMBRANE EVERY 6 HOURS
Refills: 0 | Status: DISCONTINUED | OUTPATIENT
Start: 2022-08-15 | End: 2022-08-16

## 2022-08-15 RX ADMIN — Medication 600 MILLIGRAM(S): at 18:54

## 2022-08-15 RX ADMIN — Medication 975 MILLIGRAM(S): at 09:07

## 2022-08-15 RX ADMIN — Medication 600 MILLIGRAM(S): at 13:24

## 2022-08-15 RX ADMIN — Medication 600 MILLIGRAM(S): at 17:38

## 2022-08-15 RX ADMIN — Medication 600 MILLIGRAM(S): at 12:24

## 2022-08-15 RX ADMIN — Medication 1 TABLET(S): at 12:23

## 2022-08-15 RX ADMIN — Medication 975 MILLIGRAM(S): at 08:10

## 2022-08-15 NOTE — OB RN DELIVERY SUMMARY - NSSELHIDDEN_OBGYN_ALL_OB_FT
[NS_DeliveryAttending1_OBGYN_ALL_OB_FT:[id number]],[NS_DeliveryAssist2_OBGYN_ALL_OB_FT:[id number]],[NS_DeliveryRN_OBGYN_ALL_OB_FT:[id number]]

## 2022-08-15 NOTE — DISCHARGE NOTE OB - PATIENT PORTAL LINK FT
You can access the FollowMyHealth Patient Portal offered by [facility] by registering at the following website: [email or web address]. By joining [facility]’s FollowMyHealth portal, you will also be able to view your health information using other applications (apps) compatible with our system.

## 2022-08-15 NOTE — CHART NOTE - NSCHARTNOTEFT_GEN_A_CORE
Circumcision consent signed after discussion of r/b/a. All questions answered to the patient's apparent satisfaction     Nishant Oro  PGY-1, Obstetrics & Gynecology

## 2022-08-15 NOTE — DISCHARGE NOTE OB - PROCEDURE SELECTOR
[address] [address] 
[phone] Patient: Yessenia Sumner MRN: [id number] LAZARA:[id number] About your hospitalization You were admitted on:  June 14, 2018 You last received care in the:  [facility] 2 CV STEPDOWN You were discharged on:  Kelly 15, 2018 Why you were hospitalized Your primary diagnosis was:  Pulmonary Embolism (Hcc) Your diagnoses also included:  Acute Uti, Hypokalemia, H/O Tricuspid Valve Replacement, Iv Drug User Follow-up Information Follow up With Details Comments Contact Info Tresa Elkins MD   [address] [place] [address] 
[phone] Discharge Orders None A check kalina indicates which time of day the medication should be taken. My Medications START taking these medications Instructions Each Dose to Equal  
 Morning Noon Evening Bedtime  
 apixaban 5 mg tablet Commonly known as:  Pattricia Boom Your last dose was: Your next dose is: Take 1 Tab by mouth two (2) times a day. 5 mg  
    
   
   
   
  
 tiotropium 18 mcg inhalation capsule Commonly known as:  Buddy Busman Start taking on:  6/16/2018 Your last dose was: Your next dose is: Take 1 Cap by inhalation daily. 1 Cap CHANGE how you take these medications Instructions Each Dose to Equal  
 Morning Noon Evening Bedtime HYDROmorphone 4 mg tablet Commonly known as:  DILAUDID What changed:  how much to take Your last dose was: Your next dose is: Take 1 Tab by mouth every four (4) hours as needed for Pain. 4 mg  
    
   
   
   
  
 levothyroxine 25 mcg tablet Commonly known as:  SYNTHROID Start taking on:  6/16/2018 What changed:   
- medication strength 
- how much to take - when to take this Your last dose was: Your next dose is: Take 1 Tab by mouth every morning. 25 mcg CONTINUE taking these medications Instructions Each Dose to Equal  
 Morning Noon Evening Bedtime  
 budesonide-formoterol 160-4.5 mcg/actuation Hfaa Commonly known as:  SYMBICORT Your last dose was: Your next dose is: Take 2 Puffs by inhalation daily. 2 Puff  
    
   
   
   
  
 furosemide 40 mg tablet Commonly known as:  LASIX Your last dose was: Your next dose is: Take 1 Tab by mouth daily. 40 mg  
    
   
   
   
  
 gabapentin 300 mg capsule Commonly known as:  NEURONTIN Your last dose was: Your next dose is: Take 1 Cap by mouth three (3) times daily. 300 mg RITALIN 20 mg tablet Generic drug:  methylphenidate HCl Your last dose was: Your next dose is: Take 20 mg by mouth four (4) times daily. 20 mg  
    
   
   
   
  
 SUMAtriptan 25 mg tablet Commonly known as:  IMITREX Your last dose was: Your next dose is: Take 1 Tab by mouth once as needed for Migraine for up to 1 dose. Indications: Migraine, pt unsure of dose 25 mg  
    
   
   
   
  
 traZODone 100 mg tablet Commonly known as:  Jenae Faes Your last dose was: Your next dose is: Take 1 Tab by mouth nightly. Indications: insomnia associated with depression 100 mg  
    
   
   
   
  
  
STOP taking these medications BACTRIM -800 mg per tablet Generic drug:  trimethoprim-sulfamethoxazole SUBOXONE 8-2 mg Film sublingaul film Generic drug:  buprenorphine-naloxone Where to Get Your Medications Information on where to get these meds will be given to you by the nurse or doctor. !  Ask your nurse or doctor about these medications  
  apixaban 5 mg tablet  
 budesonide-formoterol 160-4.5 mcg/actuation Hfaa  
 furosemide 40 mg tablet  
 gabapentin 300 mg capsule HYDROmorphone 4 mg tablet  
 levothyroxine 25 mcg tablet SUMAtriptan 25 mg tablet  
 tiotropium 18 mcg inhalation capsule  
 traZODone 100 mg tablet Opioid Education Prescription Opioids: What You Need to Know: 
 
 
 
F-face looks uneven A-arms unable to move or move unevenly S-speech slurred or non-existent T-time-call 911 as soon as signs and symptoms begin-DO NOT go Back to bed or wait to see if you get better-TIME IS BRAIN. Warning Signs of HEART ATTACK Call 911 if you have these symptoms: 
? Chest discomfort. Most heart attacks involve discomfort in the center of the chest that lasts more than a few minutes, or that goes away and comes back. It can feel like uncomfortable pressure, squeezing, fullness, or pain. ? Discomfort in other areas of the upper body. Symptoms can include pain or discomfort in one or both arms, the back, neck, jaw, or stomach. ? Shortness of breath with or without chest discomfort. ? Other signs may include breaking out in a cold sweat, nausea, or lightheadedness. Don't wait more than five minutes to call [address]! Fast action can save your life. Calling 911 is almost always the fastest way to get lifesaving treatment. Emergency Medical Services staff can begin treatment when they arrive  up to an hour sooner than if someone gets to the hospital by car.   
 
The discharge information has been reviewed with the {PATIENT PARENT GUARDIAN:42600}. The {PATIENT PARENT GUARDIAN:64351} verbalized understanding. Discharge medications reviewed with the {Dishcarge meds reviewed [facility]:89015} and appropriate educational materials and side effects teaching were provided. ___________________________________________________________________________________________________________________________________ Broken Arm: Care Instructions Your Care Instructions Fractures can range from a small, hairline crack, to a bone or bones broken into two or more pieces. Your treatment depends on how bad the break is. Your doctor may have put your arm in a splint or cast to allow it to heal or to keep it stable until you see another doctor. It may take weeks or months for your arm to heal. You can help your arm heal with some care at home. You heal best when you take good care of yourself. Eat a variety of healthy foods, and don't smoke. You may have had a sedative to help you relax. You may be unsteady after having sedation. It can take a few hours for the medicine's effects to wear off. Common side effects of sedation include nausea, vomiting, and feeling sleepy or tired. The doctor has checked you carefully, but problems can develop later. If you notice any problems or new symptoms, get medical treatment right away. Follow-up care is a key part of your treatment and safety. Be sure to make and go to all appointments, and call your doctor if you are having problems. It's also a good idea to know your test results and keep a list of the medicines you take. How can you care for yourself at home? · If the doctor gave you a sedative: ¨ For 24 hours, don't do anything that requires attention to detail. It takes time for the medicine's effects to completely wear off. ¨ For your safety, do not drive or operate any machinery that could be dangerous. Wait until the medicine wears off and you can think clearly and react easily. · Put ice or a cold pack on your arm for 10 to 20 minutes at a time. Try to do this every 1 to 2 hours for the next 3 days (when you are awake). Put a thin cloth between the ice and your cast or splint. Keep the cast or splint dry. · Follow the cast care instructions your doctor gives you. If you have a splint, do not take it off unless your doctor tells you to. · Be safe with medicines. Take pain medicines exactly as directed. ¨ If the doctor gave you a prescription medicine for pain, take it as prescribed. ¨ If you are not taking a prescription pain medicine, ask your doctor if you can take an over-the-counter medicine. · Prop up your arm on pillows when you sit or lie down in the first few days after the injury. Keep the arm higher than the level of your heart. This will help reduce swelling. · Follow instructions for exercises to keep your arm strong. · Wiggle your fingers and wrist often to reduce swelling and stiffness. When should you call for help? Call 911 anytime you think you may need emergency care. For example, call if: 
? · You are very sleepy and you have trouble waking up. ?Call your doctor now or seek immediate medical care if: 
? · You have new or worse nausea or vomiting. ? · You have new or worse pain. ? · Your hand or fingers are cool or pale or change color. ? · Your cast or splint feels too tight. ? · You have tingling, weakness, or numbness in your hand or fingers. ? Watch closely for changes in your health, and be sure to contact your doctor if: 
? · You do not get better as expected. ? · You have problems with your cast or splint. Where can you learn more? Go to http://kendrick-trudi.info/. Enter Z772 in the search box to learn more about \"Broken Arm: Care Instructions. \" Current as of: March 21, 2017 Content Version: 11.4 © 2627-2814 Healthwise, Incorporated.  Care instructions adapted under license by [address] (which disclaims liability or warranty for this information). If you have questions about a medical condition or this instruction, always ask your healthcare professional. [address] any warranty or liability for your use of this information. Patient armband removed and shredded Introducing [facility] SERVICES! Catawissa Benignoaleksandar introduces [employer] patient portal. Now you can access parts of your medical record, email your doctor's office, and request medication refills online. 1. In your internet browser, go to [email or web address] 2. Click on the First Time User? Click Here link in the Sign In box. You will see the New Member Sign Up page. 3. Enter your [employer] Access Code exactly as it appears below. You will not need to use this code after youve completed the sign-up process. If you do not sign up before the expiration date, you must request a new code. · [employer] Access Code: [email or web address] Expires: 9/12/2018  7:30 AM 
 
4. Enter the last four digits of your Social Security Number (xxxx) and Date of Birth (mm/dd/yyyy) as indicated and click Submit. You will be taken to the next sign-up page. 5. Create a [employer] ID. This will be your [employer] login ID and cannot be changed, so think of one that is secure and easy to remember. 6. Create a [employer] password. You can change your password at any time. 7. Enter your Password Reset Question and Answer. This can be used at a later time if you forget your password. 8. Enter your e-mail address. You will receive e-mail notification when new information is available in [address] 9. Click Sign Up. You can now view and download portions of your medical record. 10. Click the Download Summary menu link to download a portable copy of your medical information.  
 
If you have questions, please visit the Frequently Asked Questions section of the [employer]. Remember, MyChart is NOT to be used for urgent needs. For medical emergencies, dial 911. Now available from your iPhone and Android! Introducing Haider Banerjee As a [facility] patient, I wanted to make you aware of our electronic visit tool called Haider Banerjee. [facility] 24/7 allows you to connect within minutes with a medical provider 24 hours a day, seven days a week via a mobile device or tablet or logging into a secure website from your computer. You can access Haider Banerjee from anywhere in the [place]. A virtual visit might be right for you when you have a simple condition and feel like you just dont want to get out of bed, or cant get away from work for an appointment, when your regular [facility] is not available (evenings, weekends or holidays), or when youre out of town and need minor care. Electronic visits cost only $49 and if the [facility] 24/7 provider determines a prescription is needed to treat your condition, one can be electronically transmitted to a nearby pharmacy*. Please take a moment to enroll today if you have not already done so. The enrollment process is free and takes just a few minutes. To enroll, please download the [facility] 24/[place] julianna to your tablet or phone, or visit [email or web address] to enroll on your computer. And, as an [address] [place] [address] patient with a [employer] account, the results of your visits will be scanned into your electronic medical record and your primary care provider will be able to view the scanned results. We urge you to continue to see your regular [facility] provider for your ongoing medical care. And while your primary care provider may not be the one available when you seek a Haider Banerjee virtual visit, the peace of mind you get from getting a real diagnosis real time can be priceless. For more information on Haider Navarroscottfin, view our Frequently Asked Questions (FAQs) at [email or web address]. Sincerely, 
 
Basilio Ventura MD 
Chief Medical Officer Rosanna Winkler *:  certain medications cannot be prescribed via Haider Ramonsigrid Unresulted tests-please follow up with your PCP on these results Procedure/Test Authorizing Provider 2D ECHO COMPLETE ADULT (TTE) Azalia Irizarry MD  
 CBC WITH AUTOMATED DIFF Joann Burden MD  
 CBC WITH AUTOMATED DIFF Joann Burden MD  
 CULTURE, BLOOD Joann Burden MD  
 CULTURE, BLOOD Joann Burden MD  
 DUPLEX UPPER EXT VENOUS RIGHT Joann Burden MD  
 HIV 1/2 AG/AB, MD Jose Hanson MD  
 METABOLIC PANEL, Tere Blackmon MD  
 METABOLIC PANEL, MD Angelika Barker MD  
 PROTHROMBIN TIME + INR Joann Burden MD  
 PTT MD Vito May MD  
 TSH Bill Ace MD  
 XR FOREARM LT AP/LAT Joann Burden MD  
  
Providers Seen During Your Hospitalization Provider Specialty Primary office phone Joann Burden MD Hospitalist [phone] John Valdovinos MD Family Practice [phone] Your Primary Care Physician (PCP) Primary Care Physician Office Phone Office Fax Kannanjackson [place] [phone] You are allergic to the following Allergen Reactions Latex Rash Amoxicillin Anaphylaxis Ceclor (Cefaclor) Anaphylaxis Codeine Hives Erythromycin Base Itching Recent Documentation Height Weight Breastfeeding? BMI OB Status Smoking Status 1.52 m 63.2 kg No 27.35 kg/m2 Hysterectomy Current Every Day Smoker Emergency Contacts Name Discharge Info Relation Home Work Mobile Shreyas Lewis DISCHARGE CAREGIVER [3] Father [15] [phone] Patient Belongings The following personal items are in your possession at time of discharge: 
  Dental Appliances: None  Visual Aid: None      Home Medications: Locked (trazadone)   Jewelry: None  Clothing: Pajamas    Other Valuables: None  Personal Items Sent to Safe: none Discharge Instructions Attachments/References PULMONARY EMBOLISM (ENGLISH) FIBRINOLYTICS FOR PULMONARY EMBOLISM: GENERAL INFO (ENGLISH) Patient Handouts Pulmonary Embolism: Care Instructions Your Care Instructions Pulmonary embolism is the sudden blockage of an artery in the lung. Blood clots in the deep veins of the leg or pelvis (deep vein thrombosis, or DVT) are the most common cause. These blood clots can travel to the lungs. Pulmonary embolism can be very serious. Because you have had one pulmonary embolism, you are at greater risk for having another one. But you can take steps to prevent another pulmonary embolism by following your doctor's instructions. You will probably take a prescription blood-thinning medicine to prevent blood clots. A blood thinner can stop a blood clot from growing larger and prevent new clots from forming. Follow-up care is a key part of your treatment and safety. Be sure to make and go to all appointments, and call your doctor if you are having problems. It's also a good idea to know your test results and keep a list of the medicines you take. How can you care for yourself at home? · Take your medicines exactly as prescribed. Call your doctor if you think you are having a problem with your medicine. You will get more details on the specific medicines your doctor prescribes. · If you are taking a blood thinner, be sure you get instructions about how to take your medicine safely. Blood thinners can cause serious bleeding problems. Preventing future pulmonary embolisms · Exercise. Keep blood moving in your legs to keep clots from forming.  If you are traveling by car, stop every hour or so. Get out and walk around for a few minutes. If you are traveling by bus, train, or plane, get out of your seat and walk up and down the aisles every hour or so. You also can do leg exercises while you are seated. Pump your feet up and down by pulling your toes up toward your knees then pointing them down. · Get up out of bed as soon as possible after an illness or surgery. · Do not smoke. If you need help quitting, talk to your doctor about stop-smoking programs and medicines. These can increase your chances of quitting for good. · Check with your doctor before taking hormone or birth control pills. These may increase your risk of blot clots. · Ask your doctor about wearing compression stockings to help prevent blood clots in your legs. There are different types of stockings, and they need to fit right. So your doctor will recommend what you need. When should you call for help? Call 911 anytime you think you may need emergency care. For example, call if: 
? · You have shortness of breath. ? · You have chest pain. ? · You passed out (lost consciousness). ? · You cough up blood. ?Call your doctor now or seek immediate medical care if: 
? · You have new or worsening pain or swelling in your leg. ? Watch closely for changes in your health, and be sure to contact your doctor if: 
? · You do not get better as expected. Where can you learn more? Go to http://kendrick-trudi.info/. Enter F331 in the search box to learn more about \"Pulmonary Embolism: Care Instructions. \" Current as of: March 20, 2017 Content Version: 11.4 © 1333-6723 Before the Call. Care instructions adapted under license by [employer] (which disclaims liability or warranty for this information).  If you have questions about a medical condition or this instruction, always ask your healthcare professional. Lawrence Casillas[employer]orporated disclaims any warranty or liability for your use of this information. Learning About Thrombolytic Treatment for Pulmonary Embolism What is thrombolytic treatment for pulmonary embolism? Thrombolytics are medicines that dissolve blood clots fast. Blood clots cause pulmonary embolisms. Pulmonary embolism is the sudden blockage of an artery in the lung. The blockage is most often caused by a blood clot. In most cases, the clots are small and are not deadly, but they can damage the lung. If the clot is large and stops blood flow to the lung, it can be fatal. Quick treatment could save your life or reduce the risk of future problems. Your doctor will explain your treatment options based on your condition. Your options may include medicines that stop the clot from getting bigger. How is it done? This treatment is done while you are in the hospital. 
The medicine is given through a vein in your hand or arm. It travels through the bloodstream to the clot. Sometimes the doctor inserts a thin tube called a catheter through your skin into a blood vessel. The doctor moves the catheter through the blood vessel to the site of the clot. The catheter delivers the medicine to the clot. What are the risks of this treatment? This medicine greatly increases the risk of bleeding throughout your body. Bleeding is serious when it happens anywhere inside your body. It is even more serious if it happens in or around your brain. What can you expect after treatment? You'll be closely watched for some time after you get the treatment to make sure there is no internal bleeding. Lavenia Stabs also get other treatment to help you get better and to prevent another clot. Follow-up care is a key part of your treatment and safety. Be sure to make and go to all appointments, and call your doctor if you are having problems. It's also a good idea to know your test results and keep a list of the medicines you take. Where can you learn more? Go to http://kendrick-trudi.info/. Enter P512 in the search box to learn more about \"Learning About Thrombolytic Treatment for Pulmonary Embolism. \" Current as of: March 20, 2017 Content Version: 11.4 © 9004-2984 Healthwise, Incorporated. Care instructions adapted under license by [employer] (which disclaims liability or warranty for this information). If you have questions about a medical condition or this instruction, always ask your healthcare professional. [address] any warranty or liability for your use of this information. Please provide this summary of care documentation to your next provider. Signatures-by signing, you are acknowledging that this After Visit Summary has been reviewed with you and you have received a copy. Patient Signature:  ____________________________________________________________ Date:  ____________________________________________________________  
  
Raquel Garcia Provider Signature:  ____________________________________________________________ Date:  ____________________________________________________________ 1

## 2022-08-15 NOTE — DISCHARGE NOTE OB - CARE PROVIDER_API CALL
Bev Bauer  [facility]  [address] [place]  Phone: [phone]  Fax: (   )    -  Follow Up Time: 1 month

## 2022-08-15 NOTE — DISCHARGE NOTE OB - PROVIDER TOKENS
FREE:[LAST:[Brown],FIRST:[Bev],PHONE:[phone]],FAX:[(   )    -],ADDRESS:[[facility] [address]],FOLLOWUP:[1 month]]

## 2022-08-15 NOTE — DISCHARGE NOTE OB - MEDICATION SUMMARY - MEDICATIONS TO TAKE
I will START or STAY ON the medications listed below when I get home from the hospital:    acetaminophen 325 mg oral tablet  -- 3 tab(s) by mouth every 6 hours, As Needed  -- Indication: For Pain    ibuprofen 600 mg oral tablet  -- 1 tab(s) by mouth every 6 hours  -- Indication: For Pain    Prenatal Multivitamins with Folic Acid 1 mg oral tablet  -- 1 tab(s) by mouth once a day  -- Indication: For PNV

## 2022-08-15 NOTE — DISCHARGE NOTE OB - CARE PLAN
Principal Discharge DX:	Spontaneous vaginal delivery  Assessment and plan of treatment:	After discharge, please stay on pelvic rest for 6 weeks, meaning no sexual intercourse, no tampons and no douching.  No driving for 2 weeks as women can loose a lot of blood during delivery and there is a possibility of being lightheaded/fainting.  No lifting objects heavier than baby for two weeks.  Expect to have vaginal bleeding/spotting for up to six weeks. The bleeding should get lighter and more white/light brown with time.  For bleeding soaking more than a pad an hour or passing clots greater than the size of your fist, come in to the emergency department.    Follow up with your doctor in 6 weeks.   1

## 2022-08-15 NOTE — OB PROVIDER DELIVERY SUMMARY - NSPROVIDERDELIVERYNOTE_OBGYN_ALL_OB_FT
Head delivered in [place]. Nuchal x2 noted. Subsequently with gentle downward guidance, the anterior shoulder was delivered followed by the posterior shoulder and remainder of the body without difficulty. Nuchal cord x2 was easily reduced after delivery of the body    Infant passed to the mother. Cord clamping was delayed for 1 minute. Cord clamped and cut. Cord gasses obtained via a segment of cord.     Placenta was delivered spontaneously intact. Uterine massage was performed and Oxytocin was given upon delivery of the placenta. Fundus noted to be firm.     Perineum intact. Right labial laceration hemostatic.     Adequate hemostasis. EBL 100cc  Count correct x2. Head delivered in [place]. Nuchal x2 noted. Subsequently with gentle downward guidance, the anterior shoulder was delivered followed by the posterior shoulder and remainder of the body without difficulty. Nuchal cord x2 was easily reduced after delivery of the body via the somersault maneuver.     Infant passed to the mother. Cord clamping was delayed for 1 minute. Cord clamped and cut. Cord gasses obtained via a segment of cord.     Placenta was delivered spontaneously intact. Uterine massage was performed and Oxytocin was given upon delivery of the placenta. Fundus noted to be firm.     Perineum intact. Right labial laceration hemostatic.     Adequate hemostasis. EBL 100cc  Count correct x2.      Attending attestation:   Agree with above    DINORAH Carbajal MD

## 2022-08-15 NOTE — DISCHARGE NOTE OB - HOSPITAL COURSE
Patient was admitted to L+D after having presented in labor. Of note, patient had received her PNC at a facility associated with [facility]. Pt had an uncomplicated  followed by an uncomplicated postpartum course.  EBL: 100  Hct: 35.7  On Postpartum day ***, patient was discharged home in stable condition, voiding spontaneously, pain well controlled, ambulating, tolerating PO and with normal vital signs. Patient's postpartum birth control plan is *** Pt plans to follow up **** Patient was admitted to L+D after having presented in labor. Of note, patient had received her PNC at a facility associated with [facility]. Pt had an uncomplicated  followed by an uncomplicated postpartum course.  EBL: 100  Hct: 35.7  On Postpartum day 1, patient was discharged home in stable condition, voiding spontaneously, pain well controlled, ambulating, tolerating PO and with normal vital signs. Patient's postpartum birth control plan is IUD to be placed at postpartum visit.

## 2022-08-15 NOTE — OB PROVIDER DELIVERY SUMMARY - NSSELHIDDEN_OBGYN_ALL_OB_FT
[NS_DeliveryAttending1_OBGYN_ALL_OB_FT:[id number]=],[NS_DeliveryAssist2_OBGYN_ALL_OB_FT:[id number]=]

## 2022-08-15 NOTE — OB RN DELIVERY SUMMARY - NS_SEPSISRSKCALC_OBGYN_ALL_OB_FT
EOS calculated successfully. EOS Risk Factor: 0.5/1000 live births ([facility] national incidence); GA=39w1d; Temp=98.1; ROM=1.7; GBS='Unknown'; Antibiotics='No antibiotics or any antibiotics < 2 hrs prior to birth'

## 2022-08-16 VITALS
HEART RATE: 69 BPM | SYSTOLIC BLOOD PRESSURE: 115 MMHG | OXYGEN SATURATION: 98 % | TEMPERATURE: 99 F | RESPIRATION RATE: 18 BRPM | DIASTOLIC BLOOD PRESSURE: 79 MMHG

## 2022-08-16 PROCEDURE — 86769 SARS-COV-2 COVID-19 ANTIBODY: CPT

## 2022-08-16 PROCEDURE — 86901 BLOOD TYPING SEROLOGIC RH(D): CPT

## 2022-08-16 PROCEDURE — 86850 RBC ANTIBODY SCREEN: CPT

## 2022-08-16 PROCEDURE — G0463: CPT

## 2022-08-16 PROCEDURE — 87389 HIV-1 AG W/HIV-1&-2 AB AG IA: CPT

## 2022-08-16 PROCEDURE — 87522 HEPATITIS C REVRS TRNSCRPJ: CPT

## 2022-08-16 PROCEDURE — 86780 TREPONEMA PALLIDUM: CPT

## 2022-08-16 PROCEDURE — 36415 COLL VENOUS BLD VENIPUNCTURE: CPT

## 2022-08-16 PROCEDURE — 86900 BLOOD TYPING SEROLOGIC ABO: CPT

## 2022-08-16 PROCEDURE — U0003: CPT

## 2022-08-16 PROCEDURE — 59025 FETAL NON-STRESS TEST: CPT

## 2022-08-16 PROCEDURE — 59050 FETAL MONITOR W/REPORT: CPT

## 2022-08-16 PROCEDURE — U0005: CPT

## 2022-08-16 PROCEDURE — 86762 RUBELLA ANTIBODY: CPT

## 2022-08-16 PROCEDURE — 85025 COMPLETE CBC W/AUTO DIFF WBC: CPT

## 2022-08-16 PROCEDURE — 87340 HEPATITIS B SURFACE AG IA: CPT

## 2022-08-16 RX ORDER — IBUPROFEN 200 MG
1 TABLET ORAL
Qty: 0 | Refills: 0 | DISCHARGE
Start: 2022-08-16

## 2022-08-16 RX ORDER — ACETAMINOPHEN 500 MG
3 TABLET ORAL
Qty: 0 | Refills: 0 | DISCHARGE
Start: 2022-08-16

## 2022-08-16 RX ADMIN — Medication 600 MILLIGRAM(S): at 04:29

## 2022-08-16 RX ADMIN — Medication 600 MILLIGRAM(S): at 05:00

## 2022-08-16 NOTE — PROGRESS NOTE ADULT - ASSESSMENT
27y/o  PPD#1 from  in stable condition. Patient is progressing well postpartum.    #postpartum state  - Continue with po analgesia  - Increase ambulation  - Continue regular diet  - IV lock  - No labs  - For discharge today: patient to follow up with her OB (Bev Bauer, NP at [facility] - [phone])    Elvira Almodovar  PGY-1 27y/o  PPD#1 from  in stable condition. Patient is progressing well postpartum.    #postpartum state  - Continue with po analgesia  - Increase ambulation  - Continue regular diet  - IV lock  - No labs  - For discharge today: patient to follow up with her OB (Bev Bauer NP at [facility] - [phone])    D/w Dr. Je Almodovar  PGY-1

## 2022-08-16 NOTE — PROGRESS NOTE ADULT - SUBJECTIVE AND OBJECTIVE BOX
R1 Progress Note    Patient seen and examined at bedside, no acute overnight events. No acute complaints, pain well controlled. Patient is ambulating, voiding spontaneously, passing gas, and tolerating regular diet. Denies CP, SOB, N/V, HA, blurred vision, epigastric pain. Patient would like to go home today.    Vital Signs Last 24 Hours  T(C): 37 (08-16-22 @ 05:26), Max: 37.1 (08-15-22 @ 09:07)  HR: 69 (08-16-22 @ 05:26) (64 - 83)  BP: 115/79 (08-16-22 @ 05:26) (105/64 - 116/76)  RR: 18 (08-16-22 @ 05:26) (18 - 18)  SpO2: 98% (08-16-22 @ 05:26) (97% - 98%)    Physical Exam:  General: NAD  Abdomen: Soft, non-tender, non-distended, fundus firm  Pelvic: Lochia wnl    Labs:    Blood Type: O Positive  Antibody Screen: Negative  RPR: Negative               12.2   9.39  )-----------( 208      ( 08-14 @ 23:54 )             35.7         MEDICATIONS  (STANDING):  acetaminophen     Tablet .. 975 milliGRAM(s) Oral <User Schedule>  diphtheria/tetanus/pertussis (acellular) Vaccine (ADAcel) 0.5 milliLiter(s) IntraMuscular once  ibuprofen  Tablet. 600 milliGRAM(s) Oral every 6 hours  ketorolac   Injectable 30 milliGRAM(s) IV Push once  oxytocin Infusion 333.333 milliUNIT(s)/Min (1000 mL/Hr) IV Continuous <Continuous>  prenatal multivitamin 1 Tablet(s) Oral daily  sodium chloride 0.9% lock flush 3 milliLiter(s) IV Push every 8 hours    MEDICATIONS  (PRN):  benzocaine 20%/menthol 0.5% Spray 1 Spray(s) Topical every 6 hours PRN for Perineal discomfort  dibucaine 1% Ointment 1 Application(s) Topical every 6 hours PRN Perineal discomfort  diphenhydrAMINE 25 milliGRAM(s) Oral every 6 hours PRN Pruritus  hydrocortisone 1% Cream 1 Application(s) Topical every 6 hours PRN Moderate Pain (4-6)  lanolin Ointment 1 Application(s) Topical every 6 hours PRN nipple soreness  magnesium hydroxide Suspension 30 milliLiter(s) Oral two times a day PRN Constipation  oxyCODONE    IR 5 milliGRAM(s) Oral every 3 hours PRN Moderate to Severe Pain (4-10)  oxyCODONE    IR 5 milliGRAM(s) Oral once PRN Moderate to Severe Pain (4-10)  pramoxine 1%/zinc 5% Cream 1 Application(s) Topical every 4 hours PRN Moderate Pain (4-6)  simethicone 80 milliGRAM(s) Chew every 4 hours PRN Gas  witch hazel Pads 1 Application(s) Topical every 4 hours PRN Perineal discomfort

## 2022-08-16 NOTE — PROGRESS NOTE ADULT - ATTENDING COMMENTS
patient seen and examined.    Agree with resident note above.    Discharge instructions and when to call MD discussed.    Follow-up discussed.      PARISE

## 2023-07-03 NOTE — OB RN PATIENT PROFILE - VISION (WITH CORRECTIVE LENSES IF THE PATIENT USUALLY WEARS THEM):
[Diarrhea: Grade 0] : Diarrhea: Grade 0 [Negative] : Allergic/Immunologic [Constipation] : constipation [FreeTextEntry7] : chronic right sided abdominal pain - stable [de-identified] : intermittent hand and foot pain Normal vision: sees adequately in most situations; can see medication labels, newsprint

## 2023-10-16 NOTE — OB RN PATIENT PROFILE - EDUCATION PROVIDED ON ASSESSMENT OF INFANT "FEEDING CUES" AND THE IMPORTANCE OF FEEDING "ON CUE" / "BABY-LED" FEEDINGS
Tenderness: There is abdominal tenderness in the right lower quadrant, suprapubic area and left lower quadrant. Musculoskeletal:      Cervical back: Normal range of motion. Skin:     General: Skin is warm and dry. Capillary Refill: Capillary refill takes less than 2 seconds. Neurological:      Mental Status: She is alert and oriented to person, place, and time. FORMAL DIAGNOSTIC RESULTS     RADIOLOGY: Interpretation per the Radiologist below, if available at the time of this note (none if blank):  US OB LESS THAN 14 WEEKS SINGLE OR FIRST GESTATION   Final Result   Single live intrauterine pregnancy estimated 11 weeks 1 day gestational    age by todays ultrasound criteria.       This document has been electronically signed by: Lazarus Berkeley, MD on    10/16/2023 01:36 AM          LABS: (none if blank)  Labs Reviewed   CBC WITH AUTO DIFFERENTIAL - Abnormal; Notable for the following components:       Result Value    MPV 9.1 (*)     All other components within normal limits   COMPREHENSIVE METABOLIC PANEL - Abnormal; Notable for the following components:    CO2 21 (*)     Total Bilirubin <0.2 (*)     ALT 10 (*)     All other components within normal limits   HCG, QUANTITATIVE, PREGNANCY - Abnormal; Notable for the following components:    hCG,Beta Subunit,Qual,Serum 53825.0 (*)     All other components within normal limits   URINE WITH REFLEXED MICRO - Abnormal; Notable for the following components:    Blood, Urine LARGE (*)     All other components within normal limits   ANION GAP   GLOMERULAR FILTRATION RATE, ESTIMATED   OSMOLALITY     (Any cultures that may have been sent were not resulted at the time of this patient visit)    MEDICAL DECISION MAKING     Initial Differential: Includes but is not limited to miscarriage, incomplete miscarriage, threatened miscarriage, vaginal bleeding or pregnancy, subchorionic hemorrhage    Discrepancies: None    Summary: 52-year-old female with vaginal bleeding,
Statement Selected
